# Patient Record
Sex: FEMALE | Race: WHITE | NOT HISPANIC OR LATINO
[De-identification: names, ages, dates, MRNs, and addresses within clinical notes are randomized per-mention and may not be internally consistent; named-entity substitution may affect disease eponyms.]

---

## 2021-01-15 ENCOUNTER — TRANSCRIPTION ENCOUNTER (OUTPATIENT)
Age: 31
End: 2021-01-15

## 2022-03-08 ENCOUNTER — NON-APPOINTMENT (OUTPATIENT)
Age: 32
End: 2022-03-08

## 2022-03-08 ENCOUNTER — APPOINTMENT (OUTPATIENT)
Dept: OBGYN | Facility: CLINIC | Age: 32
End: 2022-03-08
Payer: MEDICAID

## 2022-03-08 VITALS
DIASTOLIC BLOOD PRESSURE: 73 MMHG | WEIGHT: 257 LBS | HEIGHT: 64 IN | BODY MASS INDEX: 43.87 KG/M2 | TEMPERATURE: 98 F | SYSTOLIC BLOOD PRESSURE: 115 MMHG

## 2022-03-08 DIAGNOSIS — N92.6 IRREGULAR MENSTRUATION, UNSPECIFIED: ICD-10-CM

## 2022-03-08 DIAGNOSIS — Z78.9 OTHER SPECIFIED HEALTH STATUS: ICD-10-CM

## 2022-03-08 DIAGNOSIS — Z11.3 ENCOUNTER FOR SCREENING FOR INFECTIONS WITH A PREDOMINANTLY SEXUAL MODE OF TRANSMISSION: ICD-10-CM

## 2022-03-08 DIAGNOSIS — Z83.3 FAMILY HISTORY OF DIABETES MELLITUS: ICD-10-CM

## 2022-03-08 DIAGNOSIS — Z01.411 ENCOUNTER FOR GYNECOLOGICAL EXAMINATION (GENERAL) (ROUTINE) WITH ABNORMAL FINDINGS: ICD-10-CM

## 2022-03-08 DIAGNOSIS — N83.291 OTHER OVARIAN CYST, RIGHT SIDE: ICD-10-CM

## 2022-03-08 DIAGNOSIS — Z80.8 FAMILY HISTORY OF MALIGNANT NEOPLASM OF OTHER ORGANS OR SYSTEMS: ICD-10-CM

## 2022-03-08 PROBLEM — Z00.00 ENCOUNTER FOR PREVENTIVE HEALTH EXAMINATION: Status: ACTIVE | Noted: 2022-03-08

## 2022-03-08 LAB
BILIRUB UR QL STRIP: NORMAL
CLARITY UR: NORMAL
COLLECTION METHOD: NORMAL
GLUCOSE UR-MCNC: NORMAL
HCG UR QL: 0.2 EU/DL
HGB UR QL STRIP.AUTO: NORMAL
KETONES UR-MCNC: NORMAL
LEUKOCYTE ESTERASE UR QL STRIP: NORMAL
NITRITE UR QL STRIP: NORMAL
PH UR STRIP: 5
PROT UR STRIP-MCNC: NORMAL
SP GR UR STRIP: 1.03

## 2022-03-08 PROCEDURE — 81003 URINALYSIS AUTO W/O SCOPE: CPT | Mod: QW

## 2022-03-08 PROCEDURE — 76830 TRANSVAGINAL US NON-OB: CPT

## 2022-03-08 PROCEDURE — 99385 PREV VISIT NEW AGE 18-39: CPT | Mod: 25

## 2022-03-08 NOTE — PLAN
[FreeTextEntry1] : Pap smear with reflex HPV testing, GC CT and trichomonas cultures done today.\par Transvaginal ultrasound in office to better visualize pelvic anatomic structures notable for uterine hypertrophy and right ovarian simple cyst, will continue to monitor.  We discussed that her irregular bleeding episode could be a consequence of the cyst, if she continues to have irregular bleeding she will make an earlier follow-up appointment visit otherwise she will follow up in 6 months for reevaluation and repeat ultrasound.

## 2022-03-08 NOTE — PROCEDURE
[Cervical Pap Smear] : cervical Pap smear [Liquid Base] : liquid base [Tolerated Well] : the patient tolerated the procedure well [No Complications] : there were no complications [Abnormal Uterine Bleeding] : abnormal uterine bleeding [Suspected Ovarian Cyst] : suspected ovarian cyst [Transvaginal Ultrasound] : transvaginal ultrasound [L: ___ cm] : L: [unfilled] cm [W: ___cm] : W: [unfilled] cm [H: ___ cm] : H: [unfilled] cm [FreeTextEntry5] : 199 cc, 12.6 mm EMS, normal-appearing cervix, normal-appearing cul-de-sac [FreeTextEntry7] : 4.2 x 5.7 x 4.2 cm, 3 cm simple cyst [FreeTextEntry8] : 2.8 x 2.4 x 2.5 cm, normal-appearing

## 2022-03-08 NOTE — HISTORY OF PRESENT ILLNESS
[FreeTextEntry1] : 31 year G2, P2 here for annual GYN visit.  Her menstrual cycles typically regular in frequency and duration however her last menstrual cycle was irregular with 6 days of initial bleeding, then she stop bleeding and had intercourse with her  and after which she had 3 days of heavy menstrual bleeding again.  She sexual active with 1 partner, inconsistent condom use.

## 2022-03-11 LAB
C TRACH RRNA SPEC QL NAA+PROBE: NOT DETECTED
N GONORRHOEA RRNA SPEC QL NAA+PROBE: NOT DETECTED
SOURCE AMPLIFICATION: NORMAL
SOURCE AMPLIFICATION: NORMAL
T VAGINALIS RRNA SPEC QL NAA+PROBE: NOT DETECTED

## 2022-03-14 LAB — CYTOLOGY CVX/VAG DOC THIN PREP: NORMAL

## 2022-09-06 ENCOUNTER — APPOINTMENT (OUTPATIENT)
Dept: OBGYN | Facility: CLINIC | Age: 32
End: 2022-09-06

## 2022-11-04 ENCOUNTER — NON-APPOINTMENT (OUTPATIENT)
Age: 32
End: 2022-11-04

## 2022-11-15 ENCOUNTER — NON-APPOINTMENT (OUTPATIENT)
Age: 32
End: 2022-11-15

## 2023-02-18 ENCOUNTER — NON-APPOINTMENT (OUTPATIENT)
Age: 33
End: 2023-02-18

## 2023-03-11 ENCOUNTER — NON-APPOINTMENT (OUTPATIENT)
Age: 33
End: 2023-03-11

## 2023-06-20 ENCOUNTER — NON-APPOINTMENT (OUTPATIENT)
Age: 33
End: 2023-06-20

## 2023-09-29 ENCOUNTER — EMERGENCY (EMERGENCY)
Facility: HOSPITAL | Age: 33
LOS: 0 days | Discharge: HOME | End: 2023-09-30
Attending: EMERGENCY MEDICINE
Payer: COMMERCIAL

## 2023-09-29 VITALS
SYSTOLIC BLOOD PRESSURE: 133 MMHG | HEART RATE: 99 BPM | OXYGEN SATURATION: 98 % | HEIGHT: 64 IN | WEIGHT: 220.02 LBS | DIASTOLIC BLOOD PRESSURE: 76 MMHG | RESPIRATION RATE: 18 BRPM | TEMPERATURE: 98 F
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
## 2023-09-29 DIAGNOSIS — S60.031A CONTUSION OF RIGHT MIDDLE FINGER WITHOUT DAMAGE TO NAIL, INITIAL ENCOUNTER: ICD-10-CM
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
## 2023-09-29 DIAGNOSIS — M25.562 PAIN IN LEFT KNEE: ICD-10-CM

## 2023-09-29 DIAGNOSIS — S60.211A CONTUSION OF RIGHT WRIST, INITIAL ENCOUNTER: ICD-10-CM
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
## 2023-09-29 PROCEDURE — 99283 EMERGENCY DEPT VISIT LOW MDM: CPT

## 2023-09-29 PROCEDURE — 99053 MED SERV 10PM-8AM 24 HR FAC: CPT

## 2023-09-29 PROCEDURE — 99282 EMERGENCY DEPT VISIT SF MDM: CPT

## 2023-09-29 NOTE — ED ADULT NURSE NOTE - NSFALLUNIVINTERV_ED_ALL_ED
Bed/Stretcher in lowest position, wheels locked, appropriate side rails in place/Call bell, personal items and telephone in reach/Instruct patient to call for assistance before getting out of bed/chair/stretcher/Non-slip footwear applied when patient is off stretcher/[place] to call system/Physically safe environment - no spills, clutter or unnecessary equipment/Purposeful proactive rounding/Room/bathroom lighting operational, light cord in reach
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
## 2023-09-30 NOTE — ED PROVIDER NOTE - PATIENT PORTAL LINK FT
You can access the FollowMyHealth Patient Portal offered by [facility] by registering at the following website: [email or web address]. By joining [email or web address]’s FollowMyHealth portal, you will also be able to view your health information using other applications (apps) compatible with our system.
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
## 2023-09-30 NOTE — ED PROVIDER NOTE - CARE PROVIDER_API CALL
Rodger Wahl  Internal Medicine  [address] [address]  Phone: [phone]  Fax: [phone]  Follow Up Time: Routine

## 2023-09-30 NOTE — ED PROVIDER NOTE - PHYSICAL EXAMINATION
CONSTITUTIONAL: Well-developed; well-nourished; in no acute distress, nontoxic appearing  SKIN: Small 1 cm diameter area of ecchymosis on the ulnar aspect of the right third digit distally.  HEAD: Normocephalic; atraumatic.  EYES: PERRL, 3 mm bilateral, no nystagmus, EOM intact; conjunctiva and sclera clear.  ENT: MMM, no nasal congestion  NECK: Supple; non tender.+ full passive ROM in all directions. No JVD  CARD: S1, S2 normal, no murmur  RESP: No wheezes, rales or rhonchi. Good air movement bilaterally  ABD: soft; non-distended; non-tender. No Rebound, No guarding  EXT: All digits with normal extension and flexion, full range of motion, no deformities, nontender to palpation, bilateral upper extremities are neurovascularly intact.  Left knee with full range of motion.  Mild tenderness to palpation medially.  NEURO: awake, alert, following commands, oriented, grossly unremarkable. No Focal deficits. GCS 15. Normal gait  PSYCH: Cooperative, appropriate.

## 2024-09-04 ENCOUNTER — NON-APPOINTMENT (OUTPATIENT)
Age: 34
End: 2024-09-04

## 2025-07-26 ENCOUNTER — NON-APPOINTMENT (OUTPATIENT)
Age: 35
End: 2025-07-26

## 2025-07-30 ENCOUNTER — NON-APPOINTMENT (OUTPATIENT)
Age: 35
End: 2025-07-30